# Patient Record
Sex: FEMALE | Race: BLACK OR AFRICAN AMERICAN | NOT HISPANIC OR LATINO | Employment: FULL TIME | URBAN - METROPOLITAN AREA
[De-identification: names, ages, dates, MRNs, and addresses within clinical notes are randomized per-mention and may not be internally consistent; named-entity substitution may affect disease eponyms.]

---

## 2019-02-19 ENCOUNTER — OFFICE VISIT (OUTPATIENT)
Dept: OBGYN CLINIC | Age: 24
End: 2019-02-19

## 2019-02-19 VITALS
WEIGHT: 107 LBS | BODY MASS INDEX: 17.83 KG/M2 | DIASTOLIC BLOOD PRESSURE: 78 MMHG | SYSTOLIC BLOOD PRESSURE: 122 MMHG | HEIGHT: 65 IN

## 2019-02-19 DIAGNOSIS — N92.6 IRREGULAR MENSES: Primary | ICD-10-CM

## 2019-02-19 PROCEDURE — 99203 OFFICE O/P NEW LOW 30 MIN: CPT | Performed by: NURSE PRACTITIONER

## 2019-02-19 NOTE — PROGRESS NOTES
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[employer], visiting from [place]  She arrived here in 2018 and states since then her cycles are not coming regularly  She reports her cycles were regular back at home  She did spot for 3 days end of January but it was very light  She is sexually active with condoms  Declines a BCM  Past Medical History:   Diagnosis Date    No known health problems      Past Surgical History:   Procedure Laterality Date    APPENDECTOMY       SECTION       Social History     Tobacco Use    Smoking status: Never Smoker    Smokeless tobacco: Never Used   Substance Use Topics    Alcohol use: Yes     Frequency: Monthly or less    Drug use: Never     Family History   Problem Relation Age of Onset    Breast cancer Neg Hx     Colon cancer Neg Hx     Ovarian cancer Neg Hx     Uterine cancer Neg Hx     Cervical cancer Neg Hx      No current outpatient medications on file  No Known Allergies  OB History    Para Term  AB Living   1 1 1     1   SAB TAB Ectopic Multiple Live Births           1      # Outcome Date GA Lbr Dragan/2nd Weight Sex Delivery Anes PTL Lv   1 Term                Vitals:    19 0803   BP: 122/78   BP Location: Right arm   Patient Position: Sitting   Weight: 48 5 kg (107 lb)   Height: 5' 5" (1 651 m)     Body mass index is 17 81 kg/m²  Review of Systems   Constitutional: Negative for chills, fatigue, fever and unexpected weight change  Respiratory: Negative for shortness of breath  Gastrointestinal: Negative for anal bleeding, blood in stool, constipation and diarrhea     Genitourinary: Negative for difficulty urinating, dysuria and hematuria  Physical Exam   Constitutional: She appears well-developed and well-nourished  No distress  HENT:   Head: Normocephalic  Neck: Normal range of motion  Neck supple  Pulmonary: Effort normal   Abdominal: Soft  Pelvic exam was performed with patient supine  No labial fusion  There is no rash, tenderness, lesion or injury on the right labia  There is no rash, tenderness, lesion or injury on the left labia  Uterus is not deviated, not enlarged, not fixed and not tender  Cervix exhibits no motion tenderness, no discharge and no friability  Right adnexum displays no mass, no tenderness and no fullness  Left adnexum displays no mass, no tenderness and no fullness  No erythema or tenderness in the vagina  No foreign body in the vagina  No signs of injury around the vagina  Minimal vaginal discharge found  Lymphadenopathy:        Right: No inguinal adenopathy present  Left: No inguinal adenopathy present  PT test done today and negative

## 2019-04-22 ENCOUNTER — APPOINTMENT (EMERGENCY)
Dept: CT IMAGING | Facility: HOSPITAL | Age: 24
End: 2019-04-22
Payer: COMMERCIAL

## 2019-04-22 ENCOUNTER — HOSPITAL ENCOUNTER (EMERGENCY)
Facility: HOSPITAL | Age: 24
Discharge: HOME | End: 2019-04-22
Attending: EMERGENCY MEDICINE
Payer: COMMERCIAL

## 2019-04-22 ENCOUNTER — APPOINTMENT (EMERGENCY)
Dept: ULTRASOUND IMAGING | Facility: HOSPITAL | Age: 24
End: 2019-04-22
Payer: COMMERCIAL

## 2019-04-22 VITALS
HEART RATE: 55 BPM | OXYGEN SATURATION: 99 % | DIASTOLIC BLOOD PRESSURE: 85 MMHG | SYSTOLIC BLOOD PRESSURE: 144 MMHG | TEMPERATURE: 98.8 F | RESPIRATION RATE: 18 BRPM

## 2019-04-22 DIAGNOSIS — R10.30 LOWER ABDOMINAL PAIN: ICD-10-CM

## 2019-04-22 DIAGNOSIS — N92.6 IRREGULAR MENSES: Primary | ICD-10-CM

## 2019-04-22 LAB
ALBUMIN SERPL BCP-MCNC: 3.9 G/DL (ref 3.5–5)
ALP SERPL-CCNC: 73 U/L (ref 46–116)
ALT SERPL W P-5'-P-CCNC: 19 U/L (ref 12–78)
ANION GAP SERPL CALCULATED.3IONS-SCNC: 11 MMOL/L (ref 4–13)
APTT PPP: 30 SECONDS (ref 26–38)
AST SERPL W P-5'-P-CCNC: 25 U/L (ref 5–45)
BACTERIA UR QL AUTO: ABNORMAL /HPF
BASOPHILS # BLD AUTO: 0.03 THOUSANDS/ΜL (ref 0–0.1)
BASOPHILS NFR BLD AUTO: 0 % (ref 0–1)
BILIRUB SERPL-MCNC: 0.4 MG/DL (ref 0.2–1)
BILIRUB UR QL STRIP: NEGATIVE
BUN SERPL-MCNC: 14 MG/DL (ref 5–25)
CALCIUM SERPL-MCNC: 9.9 MG/DL (ref 8.3–10.1)
CHLORIDE SERPL-SCNC: 104 MMOL/L (ref 100–108)
CLARITY UR: CLEAR
CO2 SERPL-SCNC: 23 MMOL/L (ref 21–32)
COLOR UR: YELLOW
CREAT SERPL-MCNC: 0.71 MG/DL (ref 0.6–1.3)
EOSINOPHIL # BLD AUTO: 0.01 THOUSAND/ΜL (ref 0–0.61)
EOSINOPHIL NFR BLD AUTO: 0 % (ref 0–6)
ERYTHROCYTE [DISTWIDTH] IN BLOOD BY AUTOMATED COUNT: 13.2 % (ref 11.6–15.1)
EXT PREG TEST URINE: NEGATIVE
GFR SERPL CREATININE-BSD FRML MDRD: 137 ML/MIN/1.73SQ M
GLUCOSE SERPL-MCNC: 86 MG/DL (ref 65–140)
GLUCOSE UR STRIP-MCNC: NEGATIVE MG/DL
HCT VFR BLD AUTO: 38.3 % (ref 34.8–46.1)
HGB BLD-MCNC: 12.3 G/DL (ref 11.5–15.4)
HGB UR QL STRIP.AUTO: ABNORMAL
HOLD SPECIMEN: NORMAL
IMM GRANULOCYTES # BLD AUTO: 0.02 THOUSAND/UL (ref 0–0.2)
IMM GRANULOCYTES NFR BLD AUTO: 0 % (ref 0–2)
INR PPP: 1.06 (ref 0.86–1.17)
KETONES UR STRIP-MCNC: ABNORMAL MG/DL
LEUKOCYTE ESTERASE UR QL STRIP: ABNORMAL
LIPASE SERPL-CCNC: 117 U/L (ref 73–393)
LYMPHOCYTES # BLD AUTO: 1.79 THOUSANDS/ΜL (ref 0.6–4.47)
LYMPHOCYTES NFR BLD AUTO: 25 % (ref 14–44)
MCH RBC QN AUTO: 29.9 PG (ref 26.8–34.3)
MCHC RBC AUTO-ENTMCNC: 32.1 G/DL (ref 31.4–37.4)
MCV RBC AUTO: 93 FL (ref 82–98)
MONOCYTES # BLD AUTO: 0.32 THOUSAND/ΜL (ref 0.17–1.22)
MONOCYTES NFR BLD AUTO: 4 % (ref 4–12)
NEUTROPHILS # BLD AUTO: 5.08 THOUSANDS/ΜL (ref 1.85–7.62)
NEUTS SEG NFR BLD AUTO: 71 % (ref 43–75)
NITRITE UR QL STRIP: NEGATIVE
NON-SQ EPI CELLS URNS QL MICRO: ABNORMAL /HPF
NRBC BLD AUTO-RTO: 0 /100 WBCS
PH UR STRIP.AUTO: 6 [PH]
PLATELET # BLD AUTO: 318 THOUSANDS/UL (ref 149–390)
PMV BLD AUTO: 10.3 FL (ref 8.9–12.7)
POTASSIUM SERPL-SCNC: 4.1 MMOL/L (ref 3.5–5.3)
PROT SERPL-MCNC: 8.1 G/DL (ref 6.4–8.2)
PROT UR STRIP-MCNC: NEGATIVE MG/DL
PROTHROMBIN TIME: 13.7 SECONDS (ref 11.8–14.2)
RBC # BLD AUTO: 4.12 MILLION/UL (ref 3.81–5.12)
RBC #/AREA URNS AUTO: ABNORMAL /HPF
SODIUM SERPL-SCNC: 138 MMOL/L (ref 136–145)
SP GR UR STRIP.AUTO: <=1.005 (ref 1–1.03)
UROBILINOGEN UR QL STRIP.AUTO: 0.2 E.U./DL
WBC # BLD AUTO: 7.25 THOUSAND/UL (ref 4.31–10.16)
WBC #/AREA URNS AUTO: ABNORMAL /HPF

## 2019-04-22 PROCEDURE — 99284 EMERGENCY DEPT VISIT MOD MDM: CPT | Performed by: EMERGENCY MEDICINE

## 2019-04-22 PROCEDURE — 87491 CHLMYD TRACH DNA AMP PROBE: CPT | Performed by: EMERGENCY MEDICINE

## 2019-04-22 PROCEDURE — 83690 ASSAY OF LIPASE: CPT | Performed by: EMERGENCY MEDICINE

## 2019-04-22 PROCEDURE — 96375 TX/PRO/DX INJ NEW DRUG ADDON: CPT

## 2019-04-22 PROCEDURE — 81001 URINALYSIS AUTO W/SCOPE: CPT | Performed by: EMERGENCY MEDICINE

## 2019-04-22 PROCEDURE — 74177 CT ABD & PELVIS W/CONTRAST: CPT

## 2019-04-22 PROCEDURE — 85610 PROTHROMBIN TIME: CPT | Performed by: EMERGENCY MEDICINE

## 2019-04-22 PROCEDURE — 96374 THER/PROPH/DIAG INJ IV PUSH: CPT

## 2019-04-22 PROCEDURE — 96361 HYDRATE IV INFUSION ADD-ON: CPT

## 2019-04-22 PROCEDURE — 99284 EMERGENCY DEPT VISIT MOD MDM: CPT

## 2019-04-22 PROCEDURE — 76856 US EXAM PELVIC COMPLETE: CPT

## 2019-04-22 PROCEDURE — 81025 URINE PREGNANCY TEST: CPT | Performed by: EMERGENCY MEDICINE

## 2019-04-22 PROCEDURE — 36415 COLL VENOUS BLD VENIPUNCTURE: CPT | Performed by: EMERGENCY MEDICINE

## 2019-04-22 PROCEDURE — 87591 N.GONORRHOEAE DNA AMP PROB: CPT | Performed by: EMERGENCY MEDICINE

## 2019-04-22 PROCEDURE — 36415 COLL VENOUS BLD VENIPUNCTURE: CPT

## 2019-04-22 PROCEDURE — 80053 COMPREHEN METABOLIC PANEL: CPT | Performed by: EMERGENCY MEDICINE

## 2019-04-22 PROCEDURE — 76830 TRANSVAGINAL US NON-OB: CPT

## 2019-04-22 PROCEDURE — 85730 THROMBOPLASTIN TIME PARTIAL: CPT | Performed by: EMERGENCY MEDICINE

## 2019-04-22 PROCEDURE — 85025 COMPLETE CBC W/AUTO DIFF WBC: CPT | Performed by: EMERGENCY MEDICINE

## 2019-04-22 RX ORDER — ONDANSETRON 2 MG/ML
4 INJECTION INTRAMUSCULAR; INTRAVENOUS ONCE
Status: COMPLETED | OUTPATIENT
Start: 2019-04-22 | End: 2019-04-22

## 2019-04-22 RX ORDER — KETOROLAC TROMETHAMINE 30 MG/ML
15 INJECTION, SOLUTION INTRAMUSCULAR; INTRAVENOUS ONCE
Status: COMPLETED | OUTPATIENT
Start: 2019-04-22 | End: 2019-04-22

## 2019-04-22 RX ORDER — MORPHINE SULFATE 4 MG/ML
4 INJECTION, SOLUTION INTRAMUSCULAR; INTRAVENOUS ONCE
Status: COMPLETED | OUTPATIENT
Start: 2019-04-22 | End: 2019-04-22

## 2019-04-22 RX ORDER — ACETAMINOPHEN 325 MG/1
650 TABLET ORAL ONCE
Status: COMPLETED | OUTPATIENT
Start: 2019-04-22 | End: 2019-04-22

## 2019-04-22 RX ADMIN — KETOROLAC TROMETHAMINE 15 MG: 30 INJECTION, SOLUTION INTRAMUSCULAR at 17:05

## 2019-04-22 RX ADMIN — SODIUM CHLORIDE 1000 ML: 0.9 INJECTION, SOLUTION INTRAVENOUS at 13:21

## 2019-04-22 RX ADMIN — ACETAMINOPHEN 650 MG: 325 TABLET, FILM COATED ORAL at 13:09

## 2019-04-22 RX ADMIN — ONDANSETRON 4 MG: 2 INJECTION INTRAMUSCULAR; INTRAVENOUS at 13:55

## 2019-04-22 RX ADMIN — IOHEXOL 100 ML: 350 INJECTION, SOLUTION INTRAVENOUS at 16:30

## 2019-04-22 RX ADMIN — MORPHINE SULFATE 4 MG: 4 INJECTION INTRAVENOUS at 13:55

## 2019-04-24 LAB
C TRACH DNA SPEC QL NAA+PROBE: NEGATIVE
N GONORRHOEA DNA SPEC QL NAA+PROBE: NEGATIVE

## 2019-07-12 ENCOUNTER — HOSPITAL ENCOUNTER (EMERGENCY)
Facility: HOSPITAL | Age: 24
Discharge: HOME | End: 2019-07-13
Attending: EMERGENCY MEDICINE | Admitting: EMERGENCY MEDICINE
Payer: COMMERCIAL

## 2019-07-12 ENCOUNTER — APPOINTMENT (EMERGENCY)
Dept: ULTRASOUND IMAGING | Facility: HOSPITAL | Age: 24
End: 2019-07-12
Payer: COMMERCIAL

## 2019-07-12 ENCOUNTER — APPOINTMENT (EMERGENCY)
Dept: CT IMAGING | Facility: HOSPITAL | Age: 24
End: 2019-07-12
Payer: COMMERCIAL

## 2019-07-12 VITALS
TEMPERATURE: 98.2 F | WEIGHT: 108.03 LBS | RESPIRATION RATE: 16 BRPM | DIASTOLIC BLOOD PRESSURE: 74 MMHG | BODY MASS INDEX: 17.98 KG/M2 | OXYGEN SATURATION: 100 % | HEART RATE: 55 BPM | SYSTOLIC BLOOD PRESSURE: 128 MMHG

## 2019-07-12 DIAGNOSIS — N83.00 OVARIAN CYST, FOLLICULAR: Primary | ICD-10-CM

## 2019-07-12 LAB
ALBUMIN SERPL BCP-MCNC: 3.7 G/DL (ref 3.5–5)
ALP SERPL-CCNC: 78 U/L (ref 46–116)
ALT SERPL W P-5'-P-CCNC: 20 U/L (ref 12–78)
ANION GAP SERPL CALCULATED.3IONS-SCNC: 12 MMOL/L (ref 4–13)
AST SERPL W P-5'-P-CCNC: 24 U/L (ref 5–45)
BACTERIA UR QL AUTO: ABNORMAL /HPF
BASOPHILS # BLD AUTO: 0.02 THOUSANDS/ΜL (ref 0–0.1)
BASOPHILS NFR BLD AUTO: 0 % (ref 0–1)
BILIRUB SERPL-MCNC: 0.4 MG/DL (ref 0.2–1)
BILIRUB UR QL STRIP: NEGATIVE
BUN SERPL-MCNC: 20 MG/DL (ref 5–25)
CALCIUM SERPL-MCNC: 9.2 MG/DL (ref 8.3–10.1)
CHLORIDE SERPL-SCNC: 100 MMOL/L (ref 100–108)
CLARITY UR: CLEAR
CO2 SERPL-SCNC: 26 MMOL/L (ref 21–32)
COLOR UR: ABNORMAL
CREAT SERPL-MCNC: 0.68 MG/DL (ref 0.6–1.3)
CRP SERPL QL: <3 MG/L
EOSINOPHIL # BLD AUTO: 0.08 THOUSAND/ΜL (ref 0–0.61)
EOSINOPHIL NFR BLD AUTO: 1 % (ref 0–6)
ERYTHROCYTE [DISTWIDTH] IN BLOOD BY AUTOMATED COUNT: 14.3 % (ref 11.6–15.1)
EXT PREG TEST URINE: NEGATIVE
EXT. CONTROL ED NAV: NORMAL
GFR SERPL CREATININE-BSD FRML MDRD: 141 ML/MIN/1.73SQ M
GLUCOSE SERPL-MCNC: 88 MG/DL (ref 65–140)
GLUCOSE UR STRIP-MCNC: NEGATIVE MG/DL
HCT VFR BLD AUTO: 35 % (ref 34.8–46.1)
HGB BLD-MCNC: 11.3 G/DL (ref 11.5–15.4)
HGB UR QL STRIP.AUTO: ABNORMAL
IMM GRANULOCYTES # BLD AUTO: 0.02 THOUSAND/UL (ref 0–0.2)
IMM GRANULOCYTES NFR BLD AUTO: 0 % (ref 0–2)
KETONES UR STRIP-MCNC: NEGATIVE MG/DL
LEUKOCYTE ESTERASE UR QL STRIP: NEGATIVE
LIPASE SERPL-CCNC: 110 U/L (ref 73–393)
LYMPHOCYTES # BLD AUTO: 2.09 THOUSANDS/ΜL (ref 0.6–4.47)
LYMPHOCYTES NFR BLD AUTO: 26 % (ref 14–44)
MCH RBC QN AUTO: 30.8 PG (ref 26.8–34.3)
MCHC RBC AUTO-ENTMCNC: 32.3 G/DL (ref 31.4–37.4)
MCV RBC AUTO: 95 FL (ref 82–98)
MONOCYTES # BLD AUTO: 0.5 THOUSAND/ΜL (ref 0.17–1.22)
MONOCYTES NFR BLD AUTO: 6 % (ref 4–12)
NEUTROPHILS # BLD AUTO: 5.4 THOUSANDS/ΜL (ref 1.85–7.62)
NEUTS SEG NFR BLD AUTO: 67 % (ref 43–75)
NITRITE UR QL STRIP: NEGATIVE
NON-SQ EPI CELLS URNS QL MICRO: ABNORMAL /HPF
NRBC BLD AUTO-RTO: 0 /100 WBCS
PH UR STRIP.AUTO: 7 [PH]
PLATELET # BLD AUTO: 275 THOUSANDS/UL (ref 149–390)
PMV BLD AUTO: 9.7 FL (ref 8.9–12.7)
POTASSIUM SERPL-SCNC: 4.1 MMOL/L (ref 3.5–5.3)
PROT SERPL-MCNC: 8.1 G/DL (ref 6.4–8.2)
PROT UR STRIP-MCNC: NEGATIVE MG/DL
RBC # BLD AUTO: 3.67 MILLION/UL (ref 3.81–5.12)
RBC #/AREA URNS AUTO: ABNORMAL /HPF
SODIUM SERPL-SCNC: 138 MMOL/L (ref 136–145)
SP GR UR STRIP.AUTO: <=1.005 (ref 1–1.03)
TROPONIN I SERPL-MCNC: <0.02 NG/ML
UROBILINOGEN UR QL STRIP.AUTO: 0.2 E.U./DL
WBC # BLD AUTO: 8.11 THOUSAND/UL (ref 4.31–10.16)
WBC #/AREA URNS AUTO: ABNORMAL /HPF

## 2019-07-12 PROCEDURE — 81001 URINALYSIS AUTO W/SCOPE: CPT | Performed by: PHYSICIAN ASSISTANT

## 2019-07-12 PROCEDURE — 96374 THER/PROPH/DIAG INJ IV PUSH: CPT

## 2019-07-12 PROCEDURE — 84484 ASSAY OF TROPONIN QUANT: CPT | Performed by: PHYSICIAN ASSISTANT

## 2019-07-12 PROCEDURE — 76830 TRANSVAGINAL US NON-OB: CPT

## 2019-07-12 PROCEDURE — 83690 ASSAY OF LIPASE: CPT | Performed by: PHYSICIAN ASSISTANT

## 2019-07-12 PROCEDURE — 74177 CT ABD & PELVIS W/CONTRAST: CPT

## 2019-07-12 PROCEDURE — 80053 COMPREHEN METABOLIC PANEL: CPT | Performed by: PHYSICIAN ASSISTANT

## 2019-07-12 PROCEDURE — 76856 US EXAM PELVIC COMPLETE: CPT

## 2019-07-12 PROCEDURE — 99284 EMERGENCY DEPT VISIT MOD MDM: CPT | Performed by: EMERGENCY MEDICINE

## 2019-07-12 PROCEDURE — 85025 COMPLETE CBC W/AUTO DIFF WBC: CPT | Performed by: PHYSICIAN ASSISTANT

## 2019-07-12 PROCEDURE — 99285 EMERGENCY DEPT VISIT HI MDM: CPT

## 2019-07-12 PROCEDURE — 36415 COLL VENOUS BLD VENIPUNCTURE: CPT | Performed by: PHYSICIAN ASSISTANT

## 2019-07-12 PROCEDURE — 93005 ELECTROCARDIOGRAM TRACING: CPT

## 2019-07-12 PROCEDURE — 81025 URINE PREGNANCY TEST: CPT | Performed by: PHYSICIAN ASSISTANT

## 2019-07-12 PROCEDURE — 86140 C-REACTIVE PROTEIN: CPT | Performed by: PHYSICIAN ASSISTANT

## 2019-07-12 RX ORDER — KETOROLAC TROMETHAMINE 30 MG/ML
15 INJECTION, SOLUTION INTRAMUSCULAR; INTRAVENOUS ONCE
Status: COMPLETED | OUTPATIENT
Start: 2019-07-12 | End: 2019-07-12

## 2019-07-12 RX ORDER — DICYCLOMINE HCL 20 MG
20 TABLET ORAL ONCE
Status: COMPLETED | OUTPATIENT
Start: 2019-07-12 | End: 2019-07-12

## 2019-07-12 RX ORDER — IBUPROFEN 400 MG/1
400 TABLET ORAL EVERY 6 HOURS PRN
Qty: 16 TABLET | Refills: 0 | Status: SHIPPED | OUTPATIENT
Start: 2019-07-12 | End: 2019-07-16

## 2019-07-12 RX ADMIN — DICYCLOMINE HYDROCHLORIDE 20 MG: 20 TABLET ORAL at 21:50

## 2019-07-12 RX ADMIN — KETOROLAC TROMETHAMINE 15 MG: 30 INJECTION, SOLUTION INTRAMUSCULAR at 21:50

## 2019-07-12 RX ADMIN — IOHEXOL 100 ML: 350 INJECTION, SOLUTION INTRAVENOUS at 21:37

## 2019-07-13 NOTE — ED PROVIDER NOTES
History  Chief Complaint   Patient presents with    Abdominal Pain     pt c/o RLQpain radiating to the right leg, no N/V/D or fever      Patient is a 20year old female with history of appendectomy, and  section that presents emergency for persistent sharp right lower quadrant pain with radiation to right foot for 1 day  Patient denies associated symptomatology  Patient verbalizes that the right lower quadrant pain with abrupt onset, and she stated she had never felt the current right upper quadrant pain before today's emergency department presentation  Patient denies palliative factors with provocative factors of pressure to right lower quadrant  Patient denies not effective treatment  Patient denies fevers, chills, nausea, vomiting  Patient denies diarrhea, constipation, symptoms  Patient, tinnitus, dizziness, meningeal, symptoms  Patient denies pelvic pain, vaginal discharge and vaginal bleeding  Patient denies recent fall or recent trauma  Patient sick contacts or recent travel  Patient denies new medications  Patient denies chest pain and shortness of breath  Patient is not in acute distress  History provided by:  Patient   used: No    Abdominal Pain   Pain location:  RLQ  Pain quality: aching    Pain radiates to:  R leg  Pain severity:  Mild  Onset quality:  Gradual  Duration:  1 day  Timing:  Constant  Progression:  Worsening  Chronicity:  New  Context: not alcohol use, not awakening from sleep, not diet changes, not eating, not laxative use, not previous surgeries, not recent illness, not recent sexual activity, not retching, not sick contacts, not suspicious food intake and not trauma    Relieved by:  None tried  Worsened by:   Movement and palpation  Ineffective treatments:  None tried  Associated symptoms: no chest pain, no chills, no constipation, no cough, no diarrhea, no dysuria, no fever, no nausea, no shortness of breath, no sore throat, no vaginal bleeding, no vaginal discharge and no vomiting    Risk factors: no alcohol abuse, no aspirin use, has not had multiple surgeries, not pregnant and no recent hospitalization        Prior to Admission Medications   Prescriptions Last Dose Informant Patient Reported? Taking?   norethindrone-ethinyl estradiol-iron (ESTROSTEP FE) 1-20/1-30/1-35 MG-MCG TABS   No No   Sig: Take 1 tablet by mouth daily for 28 days      Facility-Administered Medications: None       Past Medical History:   Diagnosis Date    No known health problems        Past Surgical History:   Procedure Laterality Date    APPENDECTOMY       SECTION         Family History   Problem Relation Age of Onset    Breast cancer Neg Hx     Colon cancer Neg Hx     Ovarian cancer Neg Hx     Uterine cancer Neg Hx     Cervical cancer Neg Hx      I have reviewed and agree with the history as documented  Social History     Tobacco Use    Smoking status: Never Smoker    Smokeless tobacco: Never Used   Substance Use Topics    Alcohol use: Yes     Frequency: Monthly or less     Comment: rarely    Drug use: Never        Review of Systems   Constitutional: Negative for activity change, appetite change, chills and fever  HENT: Negative for congestion, postnasal drip, rhinorrhea, sinus pressure, sinus pain, sore throat and tinnitus  Eyes: Negative for photophobia and visual disturbance  Respiratory: Negative for cough, chest tightness and shortness of breath  Cardiovascular: Negative for chest pain and palpitations  Gastrointestinal: Positive for abdominal pain  Negative for constipation, diarrhea, nausea and vomiting  Genitourinary: Negative for difficulty urinating, dysuria, flank pain, frequency, urgency, vaginal bleeding, vaginal discharge and vaginal pain  Musculoskeletal: Negative for back pain, gait problem, neck pain and neck stiffness  Skin: Negative for pallor and rash     Allergic/Immunologic: Negative for environmental allergies and food allergies  Neurological: Negative for dizziness, weakness, numbness and headaches  Psychiatric/Behavioral: Negative for confusion  All other systems reviewed and are negative  Physical Exam  Physical Exam   Constitutional: She is oriented to person, place, and time  Vital signs are normal  She appears well-developed and well-nourished  She is active and cooperative  Non-toxic appearance  She does not have a sickly appearance  She does not appear ill  No distress  Patient appropriate for physical examination  Patient in no acute distress  Patient with verbalization using 6-8 word sentences of current symptomatology leading to ED presentation  HENT:   Head: Normocephalic and atraumatic  Right Ear: Hearing and external ear normal  No drainage, swelling or tenderness  No mastoid tenderness  No decreased hearing is noted  Left Ear: Hearing and external ear normal  No drainage, swelling or tenderness  No mastoid tenderness  No decreased hearing is noted  Nose: Nose normal    Mouth/Throat: Uvula is midline, oropharynx is clear and moist and mucous membranes are normal    Eyes: Pupils are equal, round, and reactive to light  Conjunctivae, EOM and lids are normal  Right eye exhibits no discharge  Left eye exhibits no discharge  Neck: Trachea normal, normal range of motion, full passive range of motion without pain and phonation normal  Neck supple  No JVD present  No tracheal tenderness, no spinous process tenderness and no muscular tenderness present  No neck rigidity  No tracheal deviation and normal range of motion present  Cardiovascular: Normal rate, regular rhythm, normal heart sounds, intact distal pulses and normal pulses  Pulses:       Radial pulses are 2+ on the right side, and 2+ on the left side  Posterior tibial pulses are 2+ on the right side, and 2+ on the left side  Pulmonary/Chest: Effort normal and breath sounds normal  No stridor   She has no decreased breath sounds  She has no wheezes  She has no rhonchi  She has no rales  She exhibits no tenderness, no bony tenderness and no crepitus  Abdominal: Soft  Bowel sounds are normal  She exhibits no distension  There is tenderness in the right lower quadrant  There is no rigidity, no rebound, no guarding, no CVA tenderness, no tenderness at McBurney's point and negative London's sign  Patient has no overlying epidermal rashes, lesions, erythema, ecchymosis, or abrasion abdomen  Skin intact  Patient with exquisite pain with light palpation to right lower quadrant  Musculoskeletal: Normal range of motion  Passive ROM intact  Upper and lower extremity 5/5 bilaterally  Neurovascularly intact  No grinding or clicking of joints     Lymphadenopathy:        Head (right side): No submental, no submandibular, no tonsillar, no preauricular, no posterior auricular and no occipital adenopathy present  Head (left side): No submental, no submandibular, no tonsillar, no preauricular, no posterior auricular and no occipital adenopathy present  She has no cervical adenopathy  Right cervical: No superficial cervical, no deep cervical and no posterior cervical adenopathy present  Left cervical: No superficial cervical, no deep cervical and no posterior cervical adenopathy present  Neurological: She is alert and oriented to person, place, and time  She has normal strength and normal reflexes  No sensory deficit  GCS eye subscore is 4  GCS verbal subscore is 5  GCS motor subscore is 6  Reflex Scores:       Patellar reflexes are 2+ on the right side and 2+ on the left side  Skin: Skin is warm and intact  Capillary refill takes less than 2 seconds  She is not diaphoretic  Psychiatric: She has a normal mood and affect  Her speech is normal and behavior is normal  Judgment and thought content normal  Cognition and memory are normal    Nursing note and vitals reviewed        Vital Signs  ED Triage Vitals [07/12/19 1914]   Temperature Pulse Respirations Blood Pressure SpO2   98 2 °F (36 8 °C) 72 16 126/77 100 %      Temp src Heart Rate Source Patient Position - Orthostatic VS BP Location FiO2 (%)   -- Monitor Sitting Right arm --      Pain Score       9           Vitals:    07/12/19 1914 07/12/19 2100 07/12/19 2313   BP: 126/77 127/75 128/74   Pulse: 72 62 55   Patient Position - Orthostatic VS: Sitting           Visual Acuity      ED Medications  Medications   iohexol (OMNIPAQUE) 350 MG/ML injection (MULTI-DOSE) 100 mL (100 mL Intravenous Given 7/12/19 2137)   ketorolac (TORADOL) injection 15 mg (15 mg Intravenous Given 7/12/19 2150)   dicyclomine (BENTYL) tablet 20 mg (20 mg Oral Given 7/12/19 2150)       Diagnostic Studies  Results Reviewed     Procedure Component Value Units Date/Time    C-reactive protein [[id number]]  (Normal) Collected:  07/12/19 2028    Lab Status:  Final result Specimen:  Blood from Arm, Right Updated:  07/12/19 2101     CRP <3 0 mg/L     CBC and differential [[id number]]  (Abnormal) Collected:  07/12/19 2047    Lab Status:  Final result Specimen:  Blood from Arm, Right Updated:  07/12/19 2052     WBC 8 11 Thousand/uL      RBC 3 67 Million/uL      Hemoglobin 11 3 g/dL      Hematocrit 35 0 %      MCV 95 fL      MCH 30 8 pg      MCHC 32 3 g/dL      RDW 14 3 %      MPV 9 7 fL      Platelets 077 Thousands/uL      nRBC 0 /100 WBCs      Neutrophils Relative 67 %      Immat GRANS % 0 %      Lymphocytes Relative 26 %      Monocytes Relative 6 %      Eosinophils Relative 1 %      Basophils Relative 0 %      Neutrophils Absolute 5 40 Thousands/µL      Immature Grans Absolute 0 02 Thousand/uL      Lymphocytes Absolute 2 09 Thousands/µL      Monocytes Absolute 0 50 Thousand/µL      Eosinophils Absolute 0 08 Thousand/µL      Basophils Absolute 0 02 Thousands/µL     CMP [[id number]] Collected:  07/12/19 2021    Lab Status:  Final result Specimen:  Blood from Arm, Left Updated: 07/12/19 2052     Sodium 138 mmol/L      Potassium 4 1 mmol/L      Chloride 100 mmol/L      CO2 26 mmol/L      ANION GAP 12 mmol/L      BUN 20 mg/dL      Creatinine 0 68 mg/dL      Glucose 88 mg/dL      Calcium 9 2 mg/dL      AST 24 U/L      ALT 20 U/L      Alkaline Phosphatase 78 U/L      Total Protein 8 1 g/dL      Albumin 3 7 g/dL      Total Bilirubin 0 40 mg/dL      eGFR 141 ml/min/1 73sq m     Narrative:       [place] guidelines for Chronic Kidney Disease (CKD):     Stage 1 with normal or high GFR (GFR > 90 mL/min/1 73 square meters)    Stage 2 Mild CKD (GFR = 60-89 mL/min/1 73 square meters)    Stage 3A Moderate CKD (GFR = 45-59 mL/min/1 73 square meters)    Stage 3B Moderate CKD (GFR = 30-44 mL/min/1 73 square meters)    Stage 4 Severe CKD (GFR = 15-29 mL/min/1 73 square meters)    Stage 5 End Stage CKD (GFR <15 mL/min/1 73 square meters)  Note: GFR calculation is accurate only with a steady state creatinine    Lipase [[id number]]  (Normal) Collected:  07/12/19 2021    Lab Status:  Final result Specimen:  Blood from Arm, Left Updated:  07/12/19 2052     Lipase 110 u/L     Troponin I [[id number]]  (Normal) Collected:  07/12/19 2021    Lab Status:  Final result Specimen:  Blood from Arm, Left Updated:  07/12/19 2047     Troponin I <0 02 ng/mL     Urine Microscopic [[id number]]  (Abnormal) Collected:  07/12/19 2023    Lab Status:  Final result Specimen:  Urine, Clean Catch Updated:  07/12/19 2042     RBC, UA 0-1 /hpf      WBC, UA None Seen /hpf      Epithelial Cells Occasional /hpf      Bacteria, UA None Seen /hpf     UA w Reflex to Microscopic w Reflex to Culture [[id number]]  (Abnormal) Collected:  07/12/19 2023    Lab Status:  Final result Specimen:  Urine, Clean Catch Updated:  07/12/19 2033     Color, UA Light Yellow     Clarity, UA Clear     Specific Gravity, UA <=1 005     pH, UA 7 0     Leukocytes, UA Negative     Nitrite, UA Negative     Protein, UA Negative mg/dl Glucose, UA Negative mg/dl      Ketones, UA Negative mg/dl      Urobilinogen, UA 0 2 E U /dl      Bilirubin, UA Negative     Blood, UA Trace-lysed    POCT pregnancy, urine [[id number]]  (Normal) Resulted:  07/12/19 2028    Lab Status:  Final result Specimen:  Urine Updated:  07/12/19 2028     EXT PREG TEST UR (Ref: Negative) negative     Control valid                 US pelvis complete w transvaginal   Final Result by Yosvany Tatum MD (07/12 2302)       Dominant follicle in the right ovary  Small free fluid in the pelvis likely physiologic for the patient's age  No evidence of ovarian torsion  Workstation performed: [id number]         CT abdomen pelvis with contrast   Final Result by Yosvany Tatum MD (07/12 2204)      3 cm right ovarian cyst which could be the source of the patient's pain  Consider follow-up pelvic ultrasound if clinically warranted  Workstation performed: [id number]                    Procedures  ECG 12 Lead Documentation Only  Date/Time: 7/12/2019 8:48 PM  Performed by: Herman Owens PA-C  Authorized by: Herman Owens PA-C     Indications / Diagnosis:  Abdominal pain rule out myocardial infarction  ECG reviewed by me, the ED Provider: yes    Patient location:  ED  Previous ECG:     Previous ECG:  Unavailable    Comparison to cardiac monitor: Yes    Interpretation:     Interpretation: normal    Rate:     ECG rate:  67    ECG rate assessment: normal    Rhythm:     Rhythm: sinus rhythm    Ectopy:     Ectopy: none    QRS:     QRS axis:  Normal    QRS intervals:  Normal  Conduction:     Conduction: normal    ST segments:     ST segments:  Normal  T waves:     T waves: normal             ED Course  ED Course as of Jul 13 1042 Fri Jul 12, 2019 2048 Patient ECG no normal sinus rhythm with ventricular rate of 67; no ST changes or T-wave inversions  Troponin I: <0 02   2155 Patient has normal clinical blood labs  Negative urine pregnancy    Urinalysis normal 2217   CT abdomen pelvis with contrast  3 cm right ovarian cyst which could be the source of the patient's pain  Consider follow-up pelvic ultrasound if clinically warranted  2335 US pelvis complete w transvaginal-  Dominant follicle in the right ovary  Small free fluid in the pelvis likely physiologic for the patient's age  No evidence of ovarian torsion  MDM    Patient is a 20year old female with history of appendectomy, and  section that presents emergency for persistent sharp right lower quadrant pain with radiation to right foot for 1 day  Patient denies associated symptomatology  CT abdomen/pelvis with contrast indicates 3 cm right ovarian cyst which could be the source of patient's pain; pelvic ultrasound if clinically warranted  Ultrasound pelvis complete with transvaginal- dominant follicle in the right ovary, small free fluid in the pelvis likely physiologic for the patient's age, no evidence of ovarian torsion  Negative urine pregnancy  Normal urinalysis  CBC and differential normal; CRP negative  Patient has history of appendectomy  Patient has history of iron deficiency anemia indicated in present H&H  Normal BMP and normal lipase  ECG with normal sinus rhythm and negative troponin; rule out cardiac etiology; right lower quadrant pain however wanted to rule out uncommon presentation    Delivered Bentyl and Toradol; patient verbalized decrease in right lower quadrant pain symptoms status post medication delivery  Prescribed Tylenol and Motrin and counseled patient on medication administration and side effects  Follow-up with OBGYN  Follow-up with PCP  Follow up emergency department symptoms persist or exacerbate  Patient demonstrates verbal understanding of all imaging and laboratory findings, discharge instructions, follow-up, treatment plan      Disposition  Final diagnoses:   Ovarian cyst, follicular - Right     Time reflects when diagnosis was documented in both MDM as applicable and the Disposition within this note     Time User Action Codes Description Comment    7/12/2019 11:36 PM Gladys Alejandro Add [N83 00] Ovarian cyst, follicular     9/90/0036 11:36 PM Gladys Alejandro Modify [N83 00] Ovarian cyst, follicular Right      ED Disposition     ED Disposition Condition Date/Time Comment    Discharge Stable Fri Jul 12, 2019 11:36 PM [address] discharge to home/self care              Follow-up Information     Follow up With Specialties Details Why Contact Info Additional Information    [address] [facility] Obstetrics and Gynecology Call in 1 week for further evaluation of symptoms [facility] [phone] [address] [facility], [address], [place], [place]  49  Medicine Call in 1 week for further evaluation of symptoms [address]  [address] [facility] [id number] [phone] [facility], [address] [place] [address], [facility] 59 Emergency Department Emergency Medicine Go to  As needed 34 [facility] 1490 ED, [address] [place], [place], [address]          Discharge Medication List as of 7/12/2019 11:38 PM      START taking these medications    Details   ibuprofen (MOTRIN) 400 mg tablet Take 1 tablet (400 mg total) by mouth every 6 (six) hours as needed for mild pain for up to 4 days, Starting Fri 7/12/2019, Until Tue 7/16/2019, Print         CONTINUE these medications which have NOT CHANGED    Details   norethindrone-ethinyl estradiol-iron (ESTROSTEP FE) 1-20/1-30/1-35 MG-MCG TABS Take 1 tablet by mouth daily for 28 days, Starting Mon 4/22/2019, Until Mon 5/20/2019, Print           No discharge procedures on file     ED Provider  Electronically Signed by           Solitario Saucedo PA-C  07/13/19 1049

## 2019-07-13 NOTE — DISCHARGE INSTRUCTIONS
Take Motrin as indicated  Follow-up with OBGYN  Follow-up with PCP  Follow up emergency department symptoms persist or exacerbate

## 2019-07-14 LAB
ATRIAL RATE: 67 BPM
P AXIS: 78 DEGREES
PR INTERVAL: 178 MS
QRS AXIS: 72 DEGREES
QRSD INTERVAL: 72 MS
QT INTERVAL: 408 MS
QTC INTERVAL: 431 MS
T WAVE AXIS: 61 DEGREES
VENTRICULAR RATE: 67 BPM

## 2019-07-14 PROCEDURE — 93010 ELECTROCARDIOGRAM REPORT: CPT | Performed by: INTERNAL MEDICINE
